# Patient Record
Sex: FEMALE | Race: ASIAN | NOT HISPANIC OR LATINO | URBAN - METROPOLITAN AREA
[De-identification: names, ages, dates, MRNs, and addresses within clinical notes are randomized per-mention and may not be internally consistent; named-entity substitution may affect disease eponyms.]

---

## 2022-05-16 ENCOUNTER — EMERGENCY (EMERGENCY)
Facility: HOSPITAL | Age: 37
LOS: 1 days | Discharge: HOME | End: 2022-05-16
Attending: EMERGENCY MEDICINE | Admitting: EMERGENCY MEDICINE
Payer: MEDICAID

## 2022-05-16 VITALS
DIASTOLIC BLOOD PRESSURE: 74 MMHG | OXYGEN SATURATION: 100 % | HEART RATE: 95 BPM | SYSTOLIC BLOOD PRESSURE: 113 MMHG | RESPIRATION RATE: 18 BRPM

## 2022-05-16 VITALS
OXYGEN SATURATION: 100 % | DIASTOLIC BLOOD PRESSURE: 80 MMHG | TEMPERATURE: 98 F | RESPIRATION RATE: 18 BRPM | HEART RATE: 101 BPM | SYSTOLIC BLOOD PRESSURE: 119 MMHG
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
## 2022-05-16 PROCEDURE — 99283 EMERGENCY DEPT VISIT LOW MDM: CPT

## 2022-05-16 RX ORDER — TETANUS TOXOID, REDUCED DIPHTHERIA TOXOID AND ACELLULAR PERTUSSIS VACCINE, ADSORBED 5; 2.5; 8; 8; 2.5 [IU]/.5ML; [IU]/.5ML; UG/.5ML; UG/.5ML; UG/.5ML
0.5 SUSPENSION INTRAMUSCULAR ONCE
Refills: 0 | Status: COMPLETED | OUTPATIENT
Start: 2022-05-16 | End: 2022-05-16

## 2022-05-16 RX ADMIN — TETANUS TOXOID, REDUCED DIPHTHERIA TOXOID AND ACELLULAR PERTUSSIS VACCINE, ADSORBED 0.5 MILLILITER(S): 5; 2.5; 8; 8; 2.5 SUSPENSION INTRAMUSCULAR at 23:00

## 2022-05-16 NOTE — ED ADULT TRIAGE NOTE - CHIEF COMPLAINT QUOTE
c/o of mechanical fall x30 min. pt states she fell down a flight of stairs (approx 15 steps). pt noted to have 8cm laceration to left side of head. (bleeding controlled). Endorses "a couple minutes" of LOC. Pain to left side of head, left rib area, and left ankle Denies Blood thinner use.

## 2022-05-16 NOTE — ED PROVIDER NOTE - NSFOLLOWUPCLINICS_GEN_ALL_ED_FT
[facility] - Primary Care  Primary Care  [address] [address]  Phone: [phone]  Fax:

## 2022-05-16 NOTE — ED PROVIDER NOTE - ATTENDING CONTRIBUTION TO CARE
I performed a face-to-face evaluation of the patient and performed a history and physical examination. I agree with the history and physical examination. If this was a PA visit, I personally saw the patient with the PA and performed a substantive portion of the visit including all aspects of the medical decision making.    Accidental fall. No indication for labs/imaging (per [place] CT rule). Irrigate and staple wound. Tetanus shot (status not UTD).

## 2022-05-16 NOTE — ED PROVIDER NOTE - PHYSICAL EXAMINATION
Well appearing, well nourished, awake, alert, oriented to person, place, time/situation and in no apparent distress.    Airway patent    Eyes without scleral injection. No jaundice.    Strong pulse.    Respirations unlabored.    Abdomen soft, non-tender, no guarding.    Spine appears normal, range of motion is not limited, no muscle or joint tenderness.    Alert and oriented, no gross motor or sensory deficits.    Skin: 3.5 cm linear lac to back of head, no FB.    No SI/HI.

## 2022-05-16 NOTE — ED ADULT NURSE NOTE - OBJECTIVE STATEMENT
pt. received to [facility] A[facility] ambulatory cares for self presenting s/p singular mechanical fall. pt. endorses falling and sliding down a flight of approx. 15 stairs. pt. endorses hitting her head, approx. 7cm Lac noted to the L side of the head. endorsing approx. 4 minutes of LOC. denies use of blood thinners. endorsing minor LLE pain, as well as L sided rib pain. no acute distress noted at this time. respirations even and unlabored. denies CP, NVD, SOB, fever, chills. pending MD tate. comfort measures provided. safety precautions maintained.

## 2022-05-16 NOTE — ED PROVIDER NOTE - PATIENT PORTAL LINK FT
You can access the FollowMyHealth Patient Portal offered by [facility] by registering at the following website: [email or web address]. By joining [facility]’s FollowMyHealth portal, you will also be able to view your health information using other applications (apps) compatible with our system.

## 2022-05-16 NOTE — ED PROVIDER NOTE - NSFOLLOWUPINSTRUCTIONS_ED_ALL_ED_FT
Tylenol 500 mg (1 or 2 every 6 hours) and/or naproxen 500 mg (1 every 12 hours) for pain.     Use cold compresses (such as ice in a plastic bag) over affected areas for 15 minutes 3 times a day x 3 days. Then, use warm compresses (such as warm rice in a plastic bag) for 15 minutes 3 times a day.    The full scar healing process takes 1 year.     Staples can be removed in 5-7 days at an ER or Urgent Care.    Return if pain not controlled with oral medications or if evidence of infection (fever, pus, spreading redness).

## 2022-05-16 NOTE — ED PROVIDER NOTE - OBJECTIVE STATEMENT
Lupe: Slipped on inside stairs w/ wet slippers; fell down 15 stairs. LOC. Walked since then. C/o pain L posterior head. No CP/SOB. Walked after. Alert and oriented, no gross motor or sensory deficits.

## 2022-05-16 NOTE — ED PROVIDER NOTE - CLINICAL SUMMARY MEDICAL DECISION MAKING FREE TEXT BOX
Lupe: Accidental fall. No indication for labs/imaging (per [place] CT rule). Irrigate and staple wound. Tetanus shot (status not UTD).